# Patient Record
Sex: FEMALE | Race: WHITE | NOT HISPANIC OR LATINO | URBAN - METROPOLITAN AREA
[De-identification: names, ages, dates, MRNs, and addresses within clinical notes are randomized per-mention and may not be internally consistent; named-entity substitution may affect disease eponyms.]

---

## 2021-07-22 ENCOUNTER — OFFICE VISIT (OUTPATIENT)
Dept: URBAN - METROPOLITAN AREA CLINIC 128 | Facility: CLINIC | Age: 76
End: 2021-07-22

## 2021-08-31 ENCOUNTER — OFFICE VISIT (OUTPATIENT)
Dept: URBAN - METROPOLITAN AREA CLINIC 128 | Facility: CLINIC | Age: 76
End: 2021-08-31
Payer: MEDICARE

## 2021-08-31 DIAGNOSIS — K21.9 GASTROESOPHAGEAL REFLUX DISEASE, UNSPECIFIED WHETHER ESOPHAGITIS PRESENT: ICD-10-CM

## 2021-08-31 DIAGNOSIS — A04.8 HELICOBACTER PYLORI INFECTION: ICD-10-CM

## 2021-08-31 DIAGNOSIS — D50.9 IRON DEFICIENCY ANEMIA, UNSPECIFIED IRON DEFICIENCY ANEMIA TYPE: ICD-10-CM

## 2021-08-31 DIAGNOSIS — K57.90 DIVERTICULOSIS: ICD-10-CM

## 2021-08-31 PROCEDURE — 99203 OFFICE O/P NEW LOW 30 MIN: CPT | Performed by: INTERNAL MEDICINE

## 2021-08-31 RX ORDER — VENLAFAXINE HYDROCHLORIDE 150 MG/1
1 CAPSULE WITH FOOD CAPSULE, EXTENDED RELEASE ORAL ONCE A DAY
Status: ACTIVE | COMMUNITY

## 2021-08-31 RX ORDER — HYDROCHLOROTHIAZIDE 25 MG/1
1 TABLET IN THE MORNING TABLET ORAL ONCE A DAY
Status: ACTIVE | COMMUNITY

## 2021-08-31 RX ORDER — PEPPERMINT OIL 90 MG
AS DIRECTED CAPSULE, DELAYED, AND EXTENDED RELEASE ORAL
Status: ACTIVE | COMMUNITY

## 2021-08-31 RX ORDER — OMEPRAZOLE 40 MG/1
1 CAPSULE 30 MINUTES BEFORE MORNING MEAL CAPSULE, DELAYED RELEASE ORAL ONCE A DAY
Status: ACTIVE | COMMUNITY

## 2021-08-31 RX ORDER — POTASSIUM CHLORIDE 1500 MG/1
1 TABLET WITH FOOD TABLET, EXTENDED RELEASE ORAL ONCE A DAY
Status: ACTIVE | COMMUNITY

## 2021-08-31 NOTE — HPI-TODAY'S VISIT:
Mrs. Gray is a pleasant 75yo woman here today for evaluation of iron deficiency anemia.  There is history of diverticulosis at colonoscopy 6-7 years ago but no hx of colon polyps. BMs are regular without melena or BRBPR.  There is a hx of GERD controlled with daily omeprazole.  Recent testing was positive for H pylori and treatment was completed earlier this year.  She offer that anemia is resolving with iron supplementation.  Weight stable and appetite is good. No heart, lung, or renal disease.  No blood thinners. She does take aleve for back pain about 2 times a week.

## 2021-10-21 ENCOUNTER — OFFICE VISIT (OUTPATIENT)
Dept: URBAN - METROPOLITAN AREA SURGERY CENTER 31 | Facility: SURGERY CENTER | Age: 76
End: 2021-10-21
Payer: MEDICARE

## 2021-10-21 DIAGNOSIS — K21.00 ALKALINE REFLUX ESOPHAGITIS: ICD-10-CM

## 2021-10-21 DIAGNOSIS — K29.60 ADENOPAPILLOMATOSIS GASTRICA: ICD-10-CM

## 2021-10-21 DIAGNOSIS — D50.9 ANEMIA: ICD-10-CM

## 2021-10-21 PROCEDURE — 45378 DIAGNOSTIC COLONOSCOPY: CPT | Performed by: INTERNAL MEDICINE

## 2021-10-21 PROCEDURE — G8907 PT DOC NO EVENTS ON DISCHARG: HCPCS | Performed by: INTERNAL MEDICINE

## 2021-10-21 PROCEDURE — 43239 EGD BIOPSY SINGLE/MULTIPLE: CPT | Performed by: INTERNAL MEDICINE

## 2021-10-21 RX ORDER — PEPPERMINT OIL 90 MG
AS DIRECTED CAPSULE, DELAYED, AND EXTENDED RELEASE ORAL
Status: ACTIVE | COMMUNITY

## 2021-10-21 RX ORDER — OMEPRAZOLE 40 MG/1
1 CAPSULE 30 MINUTES BEFORE MORNING MEAL CAPSULE, DELAYED RELEASE ORAL ONCE A DAY
Status: ACTIVE | COMMUNITY

## 2021-10-21 RX ORDER — VENLAFAXINE HYDROCHLORIDE 150 MG/1
1 CAPSULE WITH FOOD CAPSULE, EXTENDED RELEASE ORAL ONCE A DAY
Status: ACTIVE | COMMUNITY

## 2021-10-21 RX ORDER — HYDROCHLOROTHIAZIDE 25 MG/1
1 TABLET IN THE MORNING TABLET ORAL ONCE A DAY
Status: ACTIVE | COMMUNITY

## 2021-10-21 RX ORDER — POTASSIUM CHLORIDE 1500 MG/1
1 TABLET WITH FOOD TABLET, EXTENDED RELEASE ORAL ONCE A DAY
Status: ACTIVE | COMMUNITY

## 2021-12-06 ENCOUNTER — DASHBOARD ENCOUNTERS (OUTPATIENT)
Age: 76
End: 2021-12-06

## 2021-12-07 ENCOUNTER — OFFICE VISIT (OUTPATIENT)
Dept: URBAN - METROPOLITAN AREA CLINIC 128 | Facility: CLINIC | Age: 76
End: 2021-12-07
Payer: MEDICARE

## 2021-12-07 VITALS
SYSTOLIC BLOOD PRESSURE: 143 MMHG | WEIGHT: 176.8 LBS | BODY MASS INDEX: 26.8 KG/M2 | HEART RATE: 71 BPM | HEIGHT: 68 IN | TEMPERATURE: 97.8 F | DIASTOLIC BLOOD PRESSURE: 84 MMHG

## 2021-12-07 DIAGNOSIS — K29.50 OTHER CHRONIC GASTRITIS WITHOUT HEMORRHAGE: ICD-10-CM

## 2021-12-07 DIAGNOSIS — K57.90 DIVERTICULOSIS: ICD-10-CM

## 2021-12-07 DIAGNOSIS — K21.9 GASTROESOPHAGEAL REFLUX DISEASE, UNSPECIFIED WHETHER ESOPHAGITIS PRESENT: ICD-10-CM

## 2021-12-07 DIAGNOSIS — K21.00 GASTROESOPHAGEAL REFLUX DISEASE WITH ESOPHAGITIS WITHOUT HEMORRHAGE: ICD-10-CM

## 2021-12-07 DIAGNOSIS — A04.8 HELICOBACTER PYLORI INFECTION: ICD-10-CM

## 2021-12-07 DIAGNOSIS — D50.9 IRON DEFICIENCY ANEMIA, UNSPECIFIED IRON DEFICIENCY ANEMIA TYPE: ICD-10-CM

## 2021-12-07 PROBLEM — 8493009: Status: ACTIVE | Noted: 2021-12-07

## 2021-12-07 PROBLEM — 397881000: Status: ACTIVE | Noted: 2021-08-31

## 2021-12-07 PROBLEM — 266433003: Status: ACTIVE | Noted: 2021-12-07

## 2021-12-07 PROBLEM — 235595009: Status: ACTIVE | Noted: 2021-10-05

## 2021-12-07 PROBLEM — 87522002: Status: ACTIVE | Noted: 2021-08-31

## 2021-12-07 PROCEDURE — 99213 OFFICE O/P EST LOW 20 MIN: CPT | Performed by: PHYSICIAN ASSISTANT

## 2021-12-07 RX ORDER — VENLAFAXINE HYDROCHLORIDE 150 MG/1
1 CAPSULE WITH FOOD CAPSULE, EXTENDED RELEASE ORAL ONCE A DAY
COMMUNITY

## 2021-12-07 RX ORDER — POTASSIUM CHLORIDE 1500 MG/1
1 TABLET WITH FOOD TABLET, EXTENDED RELEASE ORAL ONCE A DAY
COMMUNITY

## 2021-12-07 RX ORDER — OMEPRAZOLE 40 MG/1
1 CAPSULE 30 MINUTES BEFORE MORNING MEAL CAPSULE, DELAYED RELEASE ORAL ONCE A DAY
COMMUNITY

## 2021-12-07 RX ORDER — HYDROCHLOROTHIAZIDE 25 MG/1
1 TABLET IN THE MORNING TABLET ORAL ONCE A DAY
COMMUNITY

## 2021-12-07 RX ORDER — PEPPERMINT OIL 90 MG
AS DIRECTED CAPSULE, DELAYED, AND EXTENDED RELEASE ORAL
COMMUNITY

## 2021-12-07 NOTE — HPI-TODAY'S VISIT:
Mrs. Gray is a pleasant 77yo woman here today for follow up for iron deficiency anemia.  There is history of diverticulosis at colonoscopy 6-7 years ago but no hx of colon polyps. BMs are regular without melena or BRBPR.  There is a hx of GERD controlled with daily omeprazole.  Recent testing was positive for H pylori and treatment was completed earlier this year.  She offer that anemia is resolving with iron supplementation.  Weight stable and appetite is good. No heart, lung, or renal disease.  No blood thinners. She does take aleve for knee pain about 2 times a week. Her 2021 EGD revealed gastritis and reflux esophagitis and 2021 colonoscopy was normal, no specimens were collected and she was todl to repeat it in 5-10 years.